# Patient Record
Sex: FEMALE | Race: WHITE | NOT HISPANIC OR LATINO
[De-identification: names, ages, dates, MRNs, and addresses within clinical notes are randomized per-mention and may not be internally consistent; named-entity substitution may affect disease eponyms.]

---

## 2017-10-05 PROBLEM — Z00.00 ENCOUNTER FOR PREVENTIVE HEALTH EXAMINATION: Status: ACTIVE | Noted: 2017-10-05

## 2017-10-20 ENCOUNTER — APPOINTMENT (OUTPATIENT)
Dept: OTOLARYNGOLOGY | Facility: CLINIC | Age: 82
End: 2017-10-20
Payer: MEDICARE

## 2017-10-20 VITALS
SYSTOLIC BLOOD PRESSURE: 143 MMHG | OXYGEN SATURATION: 97 % | HEIGHT: 63 IN | BODY MASS INDEX: 21.97 KG/M2 | DIASTOLIC BLOOD PRESSURE: 84 MMHG | HEART RATE: 76 BPM | WEIGHT: 124 LBS

## 2017-10-20 DIAGNOSIS — Z87.891 PERSONAL HISTORY OF NICOTINE DEPENDENCE: ICD-10-CM

## 2017-10-20 DIAGNOSIS — Z82.49 FAMILY HISTORY OF ISCHEMIC HEART DISEASE AND OTHER DISEASES OF THE CIRCULATORY SYSTEM: ICD-10-CM

## 2017-10-20 DIAGNOSIS — Z86.79 PERSONAL HISTORY OF OTHER DISEASES OF THE CIRCULATORY SYSTEM: ICD-10-CM

## 2017-10-20 DIAGNOSIS — H92.02 OTALGIA, LEFT EAR: ICD-10-CM

## 2017-10-20 DIAGNOSIS — Z78.9 OTHER SPECIFIED HEALTH STATUS: ICD-10-CM

## 2017-10-20 DIAGNOSIS — H93.91 UNSPECIFIED DISORDER OF RIGHT EAR: ICD-10-CM

## 2017-10-20 DIAGNOSIS — R68.89 OTHER GENERAL SYMPTOMS AND SIGNS: ICD-10-CM

## 2017-10-20 DIAGNOSIS — F15.90 OTHER STIMULANT USE, UNSPECIFIED, UNCOMPLICATED: ICD-10-CM

## 2017-10-20 PROCEDURE — 99204 OFFICE O/P NEW MOD 45 MIN: CPT

## 2017-10-20 RX ORDER — DORZOLAMIDE HYDROCHLORIDE TIMOLOL MALEATE 20; 5 MG/ML; MG/ML
22.3-6.8 SOLUTION/ DROPS OPHTHALMIC
Qty: 10 | Refills: 0 | Status: ACTIVE | COMMUNITY
Start: 2016-09-08

## 2017-10-20 RX ORDER — DOXEPIN HYDROCHLORIDE 25 MG/1
25 CAPSULE ORAL
Qty: 90 | Refills: 0 | Status: ACTIVE | COMMUNITY
Start: 2016-07-14

## 2017-10-20 RX ORDER — HYDROCHLOROTHIAZIDE 12.5 MG/1
12.5 TABLET ORAL
Qty: 90 | Refills: 0 | Status: ACTIVE | COMMUNITY
Start: 2017-04-24

## 2017-10-20 RX ORDER — AMITRIPTYLINE HYDROCHLORIDE 25 MG/1
25 TABLET, FILM COATED ORAL
Qty: 90 | Refills: 0 | Status: ACTIVE | COMMUNITY
Start: 2016-07-14

## 2017-10-20 RX ORDER — ESTRADIOL 10 UG/1
10 TABLET VAGINAL
Qty: 24 | Refills: 0 | Status: ACTIVE | COMMUNITY
Start: 2017-03-03

## 2017-10-20 RX ORDER — ATORVASTATIN CALCIUM 20 MG/1
20 TABLET, FILM COATED ORAL
Qty: 90 | Refills: 0 | Status: ACTIVE | COMMUNITY
Start: 2017-01-13

## 2017-10-24 ENCOUNTER — APPOINTMENT (OUTPATIENT)
Dept: OTOLARYNGOLOGY | Facility: CLINIC | Age: 82
End: 2017-10-24
Payer: MEDICARE

## 2017-10-24 VITALS
SYSTOLIC BLOOD PRESSURE: 160 MMHG | TEMPERATURE: 98.6 F | HEART RATE: 74 BPM | DIASTOLIC BLOOD PRESSURE: 84 MMHG | OXYGEN SATURATION: 97 %

## 2017-10-24 PROCEDURE — 99214 OFFICE O/P EST MOD 30 MIN: CPT

## 2017-10-26 ENCOUNTER — APPOINTMENT (OUTPATIENT)
Dept: OTOLARYNGOLOGY | Facility: CLINIC | Age: 82
End: 2017-10-26
Payer: MEDICARE

## 2017-10-26 VITALS
DIASTOLIC BLOOD PRESSURE: 80 MMHG | TEMPERATURE: 97.8 F | SYSTOLIC BLOOD PRESSURE: 152 MMHG | OXYGEN SATURATION: 97 % | HEART RATE: 79 BPM

## 2017-10-26 DIAGNOSIS — R22.0 LOCALIZED SWELLING, MASS AND LUMP, HEAD: ICD-10-CM

## 2017-10-26 DIAGNOSIS — I88.8 OTHER NONSPECIFIC LYMPHADENITIS: ICD-10-CM

## 2017-10-26 DIAGNOSIS — R22.1 LOCALIZED SWELLING, MASS AND LUMP, HEAD: ICD-10-CM

## 2017-10-26 PROCEDURE — 99214 OFFICE O/P EST MOD 30 MIN: CPT | Mod: 25

## 2017-10-26 PROCEDURE — 31575 DIAGNOSTIC LARYNGOSCOPY: CPT

## 2017-10-27 PROBLEM — I88.8: Status: ACTIVE | Noted: 2017-10-27

## 2017-10-27 PROBLEM — R22.0 SWELLING, MASS, OR LUMP IN HEAD AND NECK: Status: ACTIVE | Noted: 2017-10-20

## 2017-11-09 ENCOUNTER — FORM ENCOUNTER (OUTPATIENT)
Age: 82
End: 2017-11-09

## 2017-11-10 ENCOUNTER — RESULT REVIEW (OUTPATIENT)
Age: 82
End: 2017-11-10

## 2017-11-10 ENCOUNTER — OUTPATIENT (OUTPATIENT)
Dept: OUTPATIENT SERVICES | Facility: HOSPITAL | Age: 82
LOS: 1 days | End: 2017-11-10
Payer: MEDICARE

## 2017-11-10 PROCEDURE — 10022: CPT

## 2017-11-10 PROCEDURE — 76942 ECHO GUIDE FOR BIOPSY: CPT | Mod: 26

## 2017-11-10 PROCEDURE — 88305 TISSUE EXAM BY PATHOLOGIST: CPT

## 2017-11-10 PROCEDURE — 88173 CYTOPATH EVAL FNA REPORT: CPT

## 2017-11-10 PROCEDURE — 76942 ECHO GUIDE FOR BIOPSY: CPT

## 2017-11-17 ENCOUNTER — APPOINTMENT (OUTPATIENT)
Dept: OTOLARYNGOLOGY | Facility: CLINIC | Age: 82
End: 2017-11-17

## 2018-02-07 ENCOUNTER — APPOINTMENT (OUTPATIENT)
Dept: OTOLARYNGOLOGY | Facility: CLINIC | Age: 83
End: 2018-02-07

## 2018-02-22 ENCOUNTER — APPOINTMENT (OUTPATIENT)
Dept: OTOLARYNGOLOGY | Facility: CLINIC | Age: 83
End: 2018-02-22
Payer: MEDICARE

## 2018-02-22 VITALS — SYSTOLIC BLOOD PRESSURE: 160 MMHG | HEART RATE: 69 BPM | OXYGEN SATURATION: 94 % | DIASTOLIC BLOOD PRESSURE: 79 MMHG

## 2018-02-22 DIAGNOSIS — J30.1 ALLERGIC RHINITIS DUE TO POLLEN: ICD-10-CM

## 2018-02-22 DIAGNOSIS — R68.2 DRY MOUTH, UNSPECIFIED: ICD-10-CM

## 2018-02-22 PROCEDURE — 99214 OFFICE O/P EST MOD 30 MIN: CPT | Mod: 25

## 2018-02-22 PROCEDURE — 31231 NASAL ENDOSCOPY DX: CPT

## 2018-07-23 PROBLEM — Z78.9 ALCOHOL USE: Status: ACTIVE | Noted: 2017-10-20

## 2019-12-23 ENCOUNTER — APPOINTMENT (OUTPATIENT)
Dept: ORTHOPEDIC SURGERY | Facility: CLINIC | Age: 84
End: 2019-12-23
Payer: MEDICARE

## 2019-12-23 VITALS
SYSTOLIC BLOOD PRESSURE: 149 MMHG | OXYGEN SATURATION: 94 % | WEIGHT: 124 LBS | HEIGHT: 63 IN | BODY MASS INDEX: 21.97 KG/M2 | DIASTOLIC BLOOD PRESSURE: 83 MMHG | HEART RATE: 74 BPM

## 2019-12-23 DIAGNOSIS — Z87.09 PERSONAL HISTORY OF OTHER DISEASES OF THE RESPIRATORY SYSTEM: ICD-10-CM

## 2019-12-23 DIAGNOSIS — Z87.39 PERSONAL HISTORY OF OTHER DISEASES OF THE MUSCULOSKELETAL SYSTEM AND CONNECTIVE TISSUE: ICD-10-CM

## 2019-12-23 DIAGNOSIS — Z86.39 PERSONAL HISTORY OF OTHER ENDOCRINE, NUTRITIONAL AND METABOLIC DISEASE: ICD-10-CM

## 2019-12-23 DIAGNOSIS — Z56.0 UNEMPLOYMENT, UNSPECIFIED: ICD-10-CM

## 2019-12-23 DIAGNOSIS — Z60.2 PROBLEMS RELATED TO LIVING ALONE: ICD-10-CM

## 2019-12-23 DIAGNOSIS — M51.26 OTHER INTERVERTEBRAL DISC DISPLACEMENT, LUMBAR REGION: ICD-10-CM

## 2019-12-23 PROCEDURE — 99204 OFFICE O/P NEW MOD 45 MIN: CPT

## 2019-12-23 PROCEDURE — 72100 X-RAY EXAM L-S SPINE 2/3 VWS: CPT

## 2019-12-23 PROCEDURE — 72170 X-RAY EXAM OF PELVIS: CPT

## 2019-12-23 RX ORDER — NIFEDIPINE 20 MG/1
CAPSULE ORAL
Refills: 0 | Status: ACTIVE | COMMUNITY

## 2019-12-23 RX ORDER — AZELASTINE HYDROCHLORIDE AND FLUTICASONE PROPIONATE 137; 50 UG/1; UG/1
137-50 SPRAY, METERED NASAL
Qty: 23 | Refills: 0 | Status: COMPLETED | COMMUNITY
Start: 2017-05-03 | End: 2019-12-23

## 2019-12-23 RX ORDER — UMECLIDINIUM BROMIDE AND VILANTEROL TRIFENATATE 62.5; 25 UG/1; UG/1
POWDER RESPIRATORY (INHALATION)
Refills: 0 | Status: ACTIVE | COMMUNITY

## 2019-12-23 RX ORDER — TERIPARATIDE 250 UG/ML
600 INJECTION, SOLUTION SUBCUTANEOUS
Qty: 2 | Refills: 0 | Status: COMPLETED | COMMUNITY
Start: 2017-01-10 | End: 2018-12-23

## 2019-12-23 RX ORDER — MOMETASONE 50 UG/1
50 SPRAY, METERED NASAL
Qty: 1 | Refills: 5 | Status: COMPLETED | COMMUNITY
Start: 2018-02-22 | End: 2019-12-23

## 2019-12-23 RX ORDER — DENOSUMAB 60 MG/ML
INJECTION SUBCUTANEOUS
Refills: 0 | Status: ACTIVE | COMMUNITY

## 2019-12-23 RX ORDER — CIPROFLOXACIN AND DEXAMETHASONE 3; 1 MG/ML; MG/ML
0.3-0.1 SUSPENSION/ DROPS AURICULAR (OTIC)
Qty: 7 | Refills: 0 | Status: COMPLETED | COMMUNITY
Start: 2017-07-06 | End: 2019-12-23

## 2019-12-23 SDOH — ECONOMIC STABILITY - INCOME SECURITY: UNEMPLOYMENT, UNSPECIFIED: Z56.0

## 2019-12-23 SDOH — SOCIAL STABILITY - SOCIAL INSECURITY: PROBLEMS RELATED TO LIVING ALONE: Z60.2

## 2019-12-23 NOTE — CONSULT LETTER
[Dear  ___] : Dear  [unfilled], [Consult Closing:] : Thank you very much for allowing me to participate in the care of this patient.  If you have any questions, please do not hesitate to contact me. [Sincerely,] : Sincerely, [Please see my note below.] : Please see my note below. [Consult Letter:] : I had the pleasure of evaluating your patient, [unfilled]. [FreeTextEntry3] : Nilda Zamora MD\par Orthopedic Surgery\par Sports Medicine\par  [FreeTextEntry2] : Alan Dechiario, MD

## 2019-12-23 NOTE — ASSESSMENT
[FreeTextEntry1] : 84-year-old woman status post lumbar fusion in the past with about one month of pain intermittently in the RIGHT gluteal region. She likely has some gluteal tendinopathy. This pain could be referred from the lumbar spine where she has spondylosis and prior disc herniations. She does not seem to have pain in the distribution of the herniated disc seen on the MRI several years ago compressing the L2 and L3 nerve roots on the LEFT side.\par Neurologically there were no significant deficits which is good. The hip joint looks fine. She may have some mild trochanteric bursitis.\par She is going to try taking an anti-inflammatory, Ibuprofen or naproxen, for the next one to 2 weeks consistently. Warm soaks and ice intermittently. She will resume doing some of her back exercises that she is warranted the past with gentle stretching, mobility and strengthening.\par Followup in about 4-6 weeks if it's not improving in which case I may refer her to physical therapy, consider corticosteroid injection in the greater trochanteric bursa or work up further.\par \par \par

## 2019-12-23 NOTE — REVIEW OF SYSTEMS
[Joint Pain] : joint pain [Joint Stiffness] : joint stiffness [Constipation] : constipation [Urinary Urgency] : urinary urgency [Urinary Frequency] : urinary frequency [Incontinence] : incontinence [Negative] : Endocrine

## 2019-12-23 NOTE — PHYSICAL EXAM
[de-identified] : \par AP pelvic x-ray shows no significant hip osteoarthritis.Small calcification just superior to the RIGHT greater trochanter which may be a small calcification in the gluteus medius.\par There is the implant that she has with the wire seen going to her sacrum. Evidence of the PLIF. Multilevel degenerative disc disease. [de-identified] : Hips and back\par She walks with a nonantalgic gait. She can walk on her heels and toes.\par Stiffness in the lumbar spine with range of motion without pain. Forward flexion just past her knees.\par Stiffness and tightness in the hamstrings on straight leg raise to about 60° without radiculopathy or pain.\par There is mild tenderness in the RIGHT gluteus Bilaterally there is some mild tenderness in the greater trochanter which is similar.\par Intact straight leg raise and hip abduction without pain or weakness.\par passive range of motion of the hips is without any significant pain or limitation.\par Sensation and motor intact distally.\par Palpable RIGHT gluteal nerve stimulator implant which is not tender

## 2019-12-23 NOTE — HISTORY OF PRESENT ILLNESS
[de-identified] : 83 yo woman with 1 mo of right lateral-posterior hip pain and some pain into the right low back.\par Pain is worse with bending\par Pain is 5/10 intermittently and variable. Occasionally she feels a nerve shooting pain and sometimes is cramping.\par Pain lasts 2-10 min but goes away. \par No treatment has been done and she doesn't take anything for pain b/c it always goes away if she waits. he was not having pain at the time of the office visit..\par No numbness or tingling. she had lumbar fusion in 2012. She had an MRI of her Lumbar spine 2016 showing a large LEFT L2-L3 disc extrusion superiorly with L2 and L3 nerve compression on the LEFT side.There was a grade 1 anterolisthesis at L4 on L5 with right-sided foraminal stenosis causing RIGHT L4 nerve impingement and postsurgical changes from posterior lumbar interbody fusion at L3-L4 and L4-L5.\par She has a neural implant in her low back placed 1 1/2 yrs ago for incontinence.

## 2020-01-24 PROBLEM — M76.01 TENDINOPATHY OF RIGHT GLUTEAL REGION: Status: ACTIVE | Noted: 2019-12-23

## 2020-01-27 ENCOUNTER — APPOINTMENT (OUTPATIENT)
Dept: ORTHOPEDIC SURGERY | Facility: CLINIC | Age: 85
End: 2020-01-27
Payer: MEDICARE

## 2020-01-27 DIAGNOSIS — M76.01 GLUTEAL TENDINITIS, RIGHT HIP: ICD-10-CM

## 2020-01-27 DIAGNOSIS — M47.816 SPONDYLOSIS W/OUT MYELOPATHY OR RADICULOPATHY, LUMBAR REGION: ICD-10-CM

## 2020-01-27 PROCEDURE — 99214 OFFICE O/P EST MOD 30 MIN: CPT

## 2020-01-27 NOTE — HISTORY OF PRESENT ILLNESS
[de-identified] : 83 yo comes in for f/u for her right  hip pain\par She had been taking 1 Aleve BID which got rid of all her pain. she felt great when she was taking a consistently.\par She stopped it last week Tues. and noticed that there is still pain intermittently in the lateral hip and anterior thigh.\par It is worse after sitting when she first gets up after sitting for awhile. Pain usually wears off after walking a bit but not always. \par numbness or tingling.No significant back pain.\par no groin pain.Pain is variable.

## 2020-01-27 NOTE — PHYSICAL EXAM
[de-identified] : Right hip /low back\par she walks with a nonantalgic gait and doesn't have pain. She can walk on her heels and toes.\par Lumbar range of motion is with stiffness with forward flexion just past her knees.\par Straight leg raise causes more hamstring pain and stretching RIGHT than LEFT at about 70°.\par Hip and knee range of motion are intact without any significant pain in the hip or knee area.\par motor and sensation are intact.\par intact straight leg raise and hip abduction bilaterally.\par Mild symmetric tenderness bilateral greater trochanters.

## 2020-10-09 NOTE — ASSESSMENT
Pt on RA with documented sats.  Will continue to monitor.  The proper method of use, as well as anticipated side effects, of this aerosol treatment are discussed and demonstrated to the patient.      [FreeTextEntry1] : 85 yo woman with right hip pain The pain was alleviated completely when she took one Aleve twice a day She obviously is concerned about taking it chronically and there can be potential side effects. I suggested trying to take Actos once a day after breakfast and see how she does with that. If she wants she can take it every other day her alternate with Tylenol to minimize side effects.Should speak to  her internist about any concerns of her taking it chronically.Obviously we tried a way that risk and benefit.Heat and ice may help. She hasn't tried physical therapy and I suggested doing some therapy to see if this helps.\par She has a spinal implant and cannot get an MRI without removing it and therefore if we want to work this up further we could get an ultrasound or CT scan.\par Followup If she's not better in the next couple months or prn

## 2024-08-14 ENCOUNTER — NON-APPOINTMENT (OUTPATIENT)
Age: 89
End: 2024-08-14

## 2024-08-16 ENCOUNTER — NON-APPOINTMENT (OUTPATIENT)
Age: 89
End: 2024-08-16

## 2024-08-16 ENCOUNTER — APPOINTMENT (OUTPATIENT)
Dept: COLORECTAL SURGERY | Facility: CLINIC | Age: 89
End: 2024-08-16

## 2024-08-16 VITALS
WEIGHT: 108 LBS | TEMPERATURE: 98.1 F | BODY MASS INDEX: 19.14 KG/M2 | DIASTOLIC BLOOD PRESSURE: 54 MMHG | HEART RATE: 60 BPM | SYSTOLIC BLOOD PRESSURE: 153 MMHG | HEIGHT: 63 IN

## 2024-08-16 VITALS — RESPIRATION RATE: 16 BRPM

## 2024-08-16 DIAGNOSIS — K62.3 RECTAL PROLAPSE: ICD-10-CM

## 2024-08-16 PROCEDURE — 99203 OFFICE O/P NEW LOW 30 MIN: CPT | Mod: 25

## 2024-08-16 PROCEDURE — 46600 DIAGNOSTIC ANOSCOPY SPX: CPT

## 2024-08-16 RX ORDER — GABAPENTIN 300 MG/1
300 CAPSULE ORAL
Refills: 0 | Status: ACTIVE | COMMUNITY

## 2024-08-16 RX ORDER — MINOXIDIL 2.5 MG/1
2.5 TABLET ORAL DAILY
Refills: 0 | Status: ACTIVE | COMMUNITY

## 2024-08-16 RX ORDER — LOSARTAN POTASSIUM 100 MG/1
100 TABLET, FILM COATED ORAL DAILY
Refills: 0 | Status: ACTIVE | COMMUNITY

## 2024-08-16 RX ORDER — EZETIMIBE 10 MG/1
10 TABLET ORAL DAILY
Refills: 0 | Status: ACTIVE | COMMUNITY

## 2024-08-16 RX ORDER — CELECOXIB 200 MG/1
200 CAPSULE ORAL TWICE DAILY
Refills: 0 | Status: ACTIVE | COMMUNITY

## 2024-08-16 RX ORDER — FLUTICASONE PROPIONATE 50 UG/1
SPRAY, METERED NASAL
Refills: 0 | Status: ACTIVE | COMMUNITY

## 2024-08-16 NOTE — HISTORY OF PRESENT ILLNESS
[FreeTextEntry1] : 88 y/o F with PMHx of HTN, HLD, Osteoporosis, lower back pain, IBS (with constipation), BCC, SCC, skin melanoma, [place], here with complain of hemorrhoids. She had hemorrhoids for few years. This time she had hemorrhoid for over a month. It has been worse recently. It is prolapsed out. Has some bleeding after bowel movements. Blood is on tissue only.  Bowel movements vary. Sometimes she does not go for 4-5 days, and she takes Senna 8.6mg when needed. Then she has BM after taking senna for two days. When she has BM after Senna, she goes 6-8 times a day. She is working with a GI to figure out what works for her. She had tried MiraLAX and Mineral oil in past.   Last colonoscopy is in 4/2018. Had no polyps. Had diverticulosis only.   After spinal surgery on 5/29/24 she had difficulty urinating and she has been self-catheterizing 4 times a day. She also has fecal incontinence for which she had nerve simulator placed 5 years ago, and it was not helping. It has been turned off since Fall 2023. Incontinence is getting worse now. She is thinking either to restart it or just remove it.

## 2024-08-16 NOTE — PHYSICAL EXAM
[Abdomen Masses] : No abdominal masses [Abdomen Tenderness] : ~T No ~M abdominal tenderness [Excoriation] : no perianal excoriation [Fistula] : no fistulas [Wart] : no warts [Ulcer ___ cm] : no ulcers [Manually Reducible] : a manually reducible (grade III) [Tender, Swollen] : nontender, non-swollen [Thrombosed] : that was not thrombosed [Skin Tags] : there were no residual hemorrhoidal skin tags seen [Lax] : was lax [None] : there was no rectal mass  [Stool Sample Taken] : no stool obtained on rectal exam [Gross Blood] : no gross blood [Fecal Impaction] : no fecal impaction was present [Normal Breath Sounds] : Normal breath sounds [Normal Heart Sounds] : normal heart sounds [2+] : left 2+ [No Rash or Lesion] : No rash or lesion [Alert] : alert [Oriented to Person] : oriented to person [Oriented to Place] : oriented to place [Oriented to Time] : oriented to time [Calm] : calm [de-identified] : lower midline scar, no tendreness or mass.  Soft [de-identified] : anus: externally WNL.  digital: tone low normal at rest. no masses. Anterior rectocole, small to moderate with push.  anoscopy: anterior wall mucoal prolapse that comes out 2 cm or so on exam table.  When pushes hard minus anoscope can prolapse as well anterior wall.   [de-identified] : anterior rectal wall prolapse suspected strongly [de-identified] : NAD

## 2024-08-16 NOTE — ASSESSMENT
[FreeTextEntry1] : A/P  Anterior rectal wall prolapse (partial circumferential).  Fecal incontinence as well.   Not hemorrhoids (grade 0-1 only). Also c/o incontnience.   She thinks that she proalpses during day between BM's but isnt certain since sometimes when she has that sensation She straight caths 4 x /day and then can see the prolapse at that time she finds instead some stool in underwear. Suggest she keep diary of how often she has the prolapse and then when she gets the sensation that she check and see if it is present. If present she would manually reduce it and see if it stays in and if sx's edelmira.  She has had unsuccessful back surgery recently and doesnt want more surgery. I agree. Her prolapse related sx's are not limiting her ambulation.  Her leg weakness is doing that. Will do diary and RTC.

## 2024-08-16 NOTE — HISTORY OF PRESENT ILLNESS
[FreeTextEntry1] : 89 y/o F with PMHx of HTN, HLD, Osteoporosis, lower back pain, IBS (with constipation), BCC, SCC, skin melanoma, [place], here with complain of hemorrhoids. She had hemorrhoids for few years. This time she had hemorrhoid for over a month. It has been worse recently. It is prolapsed out. Has some bleeding after bowel movements. Blood is on tissue only.  Bowel movements vary. Sometimes she does not go for 4-5 days, and she takes Senna 8.6mg when needed. Then she has BM after taking senna for two days. When she has BM after Senna, she goes 6-8 times a day. She is working with a GI to figure out what works for her. She had tried MiraLAX and Mineral oil in past.   Last colonoscopy is in 4/2018. Had no polyps. Had diverticulosis only.   After spinal surgery on 5/29/24 she had difficulty urinating and she has been self-catheterizing 4 times a day. She also has fecal incontinence for which she had nerve simulator placed 5 years ago, and it was not helping. It has been turned off since Fall 2023. Incontinence is getting worse now. She is thinking either to restart it or just remove it.

## 2024-08-16 NOTE — PHYSICAL EXAM
[Abdomen Masses] : No abdominal masses [Abdomen Tenderness] : ~T No ~M abdominal tenderness [Excoriation] : no perianal excoriation [Fistula] : no fistulas [Wart] : no warts [Ulcer ___ cm] : no ulcers [Manually Reducible] : a manually reducible (grade III) [Tender, Swollen] : nontender, non-swollen [Thrombosed] : that was not thrombosed [Skin Tags] : there were no residual hemorrhoidal skin tags seen [Lax] : was lax [None] : there was no rectal mass  [Stool Sample Taken] : no stool obtained on rectal exam [Gross Blood] : no gross blood [Fecal Impaction] : no fecal impaction was present [Normal Breath Sounds] : Normal breath sounds [Normal Heart Sounds] : normal heart sounds [2+] : left 2+ [No Rash or Lesion] : No rash or lesion [Alert] : alert [Oriented to Person] : oriented to person [Oriented to Place] : oriented to place [Oriented to Time] : oriented to time [Calm] : calm [de-identified] : lower midline scar, no tendreness or mass.  Soft [de-identified] : anus: externally WNL.  digital: tone low normal at rest. no masses. Anterior rectocole, small to moderate with push.  anoscopy: anterior wall mucoal prolapse that comes out 2 cm or so on exam table.  When pushes hard minus anoscope can prolapse as well anterior wall.   [de-identified] : anterior rectal wall prolapse suspected strongly [de-identified] : NAD

## 2024-08-16 NOTE — REVIEW OF SYSTEMS
[Recent Weight Loss (___ Lbs)] : recent [unfilled] ~Ulb weight loss [Easy Bruising] : a tendency for easy bruising [Negative] : Endocrine [Fever] : no fever [Chills] : no chills [Feeling Poorly] : not feeling poorly [Feeling Tired] : not feeling tired [Recent Weight Gain (___ Lbs)] : no recent weight gain [Nosebleeds] : no nosebleeds [Nasal Discharge] : no nasal discharge [Sore Throat] : no sore throat [Hoarseness] : no hoarseness [Chest Pain] : no chest pain [Palpitations] : no palpitations [Shortness Of Breath] : no shortness of breath [Wheezing] : no wheezing [Cough] : no cough [SOB on Exertion] : no shortness of breath during exertion [Abdominal Pain] : no abdominal pain [Vomiting] : no vomiting [Dizziness] : no dizziness [Fainting] : no fainting [Easy Bleeding] : no tendency for easy bleeding

## 2024-09-23 ENCOUNTER — APPOINTMENT (OUTPATIENT)
Dept: COLORECTAL SURGERY | Facility: CLINIC | Age: 89
End: 2024-09-23
Payer: MEDICARE

## 2024-09-23 VITALS
DIASTOLIC BLOOD PRESSURE: 58 MMHG | SYSTOLIC BLOOD PRESSURE: 108 MMHG | TEMPERATURE: 97.2 F | HEIGHT: 63 IN | OXYGEN SATURATION: 96 % | HEART RATE: 62 BPM

## 2024-09-23 VITALS — BODY MASS INDEX: 18.78 KG/M2 | WEIGHT: 106 LBS

## 2024-09-23 PROCEDURE — 46600 DIAGNOSTIC ANOSCOPY SPX: CPT

## 2024-09-23 NOTE — HISTORY OF PRESENT ILLNESS
[FreeTextEntry1] : 88 y/o F with PMHx of HTN, HLD, Osteoporosis, lower back pain, IBS (with constipation), BCC, SCC, skin melanoma, [place] was seen for anterior rectal wall prolapse (partial circumferential) on 8/16/24. She thought that she prolapses during day between BM's but wasn't certain since sometimes when she has that sensation. She was suggested to keep diary of how often she has the prolapse and then when she gets the sensation that she check and see if it is present. If present, she should manually reduce it and see if it stays in and if sx's edelmira. She states that she has the prolapse every day regardless of BM. However, it is more often out when she is having frequent BM. Denies any bleeding. Denies any pain. She pushes the prolapse in and sometimes it comes out right away and sometimes stays in longer.   Bowel movements vary. Sometimes she does not go for 4-5 days, and she takes Senna 8.6mg when needed. Then she has BM after taking senna for two days. When she has BM after Senna, she goes 6-8 times a day. She is working with a GI to figure out what works for her. She had tried MiraLAX and Mineral oil in past.   Last colonoscopy is in 4/2018. Had no polyps. Had diverticulosis only.   After spinal surgery on 5/29/24 she had difficulty urinating and she has been self-catheterizing 4 times a day. She has rectal prolapse every time she catharizes. She also has fecal incontinence for which she had nerve simulator placed 5 years ago, and it was not helping. It has been turned off since Fall 2023. Incontinence is getting worse now. She is thinking either to restart it or just remove it.

## 2024-09-23 NOTE — PHYSICAL EXAM
[Excoriation] : no perianal excoriation [Fistula] : no fistulas [Wart] : no warts [Ulcer ___ cm] : no ulcers [Reduce Spontaneously] : a spontaneously reducible (grade II) [Tender, Swollen] : nontender, non-swollen [Thrombosed] : that was not thrombosed [Skin Tags] : there were no residual hemorrhoidal skin tags seen [Lax] : was lax [None] : there was no rectal mass  [Stool Sample Taken] : no stool obtained on rectal exam [Gross Blood] : no gross blood [de-identified] : no prolapse at rest.  With hard push x >30 seconds 1-1.5 cm of mucosa prolapses.  It is anterior and left anterior wall.  digital: tone low.  bidigital and rectovaginal confirms that she has anterior rectocele and that she prolapses the anterior wall down with puch.  Slef reduceds after stopping the push.  Anoscopy: RMP, no lesions or proctitis.

## 2024-09-23 NOTE — ASSESSMENT
[FreeTextEntry1] : Anterior rectal wall prolapse in weak pelvis setting.   Been incoontinent x 5 years and it is getting worse. No full thickness circumferential prolapse by exam or hx. Anterior RMP and probable full thickness element to it.  Not severe. Counseled to do kegels and wear depends and live with it. I dont think that excising the anterior prolapse will impact her incontinence. She has a SNS device in place and is considering removing it to allow MRI and CT scans that would help dx her back issues. She is seeing Dr. Menendez tomorrow about the SNS device. I advised holding off on surgery for the prolapse. It has bee 5 years since last C scope and she has hx of polyps.  I advised she have colonoscopy periodically since if a small cancer was found she would tolerate a colectomy.  Her MS is very good and she is amublatory and having full life.

## 2024-12-24 PROBLEM — F10.90 ALCOHOL USE: Status: ACTIVE | Noted: 2017-10-20

## 2025-07-21 ENCOUNTER — NON-APPOINTMENT (OUTPATIENT)
Age: 89
End: 2025-07-21

## 2025-07-21 ENCOUNTER — APPOINTMENT (OUTPATIENT)
Dept: COLORECTAL SURGERY | Facility: CLINIC | Age: 89
End: 2025-07-21
Payer: MEDICARE

## 2025-07-21 VITALS
RESPIRATION RATE: 16 BRPM | DIASTOLIC BLOOD PRESSURE: 68 MMHG | WEIGHT: 106 LBS | SYSTOLIC BLOOD PRESSURE: 148 MMHG | HEIGHT: 63 IN | HEART RATE: 76 BPM | OXYGEN SATURATION: 96 % | BODY MASS INDEX: 18.78 KG/M2

## 2025-07-21 DIAGNOSIS — K62.89 OTHER SPECIFIED DISEASES OF ANUS AND RECTUM: ICD-10-CM

## 2025-07-21 PROCEDURE — 46600 DIAGNOSTIC ANOSCOPY SPX: CPT

## 2025-07-21 RX ORDER — HYDROCORTISONE ACETATE 25 MG/1
25 SUPPOSITORY RECTAL
Qty: 14 | Refills: 0 | Status: ACTIVE | COMMUNITY
Start: 2025-07-21 | End: 1900-01-01